# Patient Record
Sex: MALE | Race: WHITE | NOT HISPANIC OR LATINO | URBAN - METROPOLITAN AREA
[De-identification: names, ages, dates, MRNs, and addresses within clinical notes are randomized per-mention and may not be internally consistent; named-entity substitution may affect disease eponyms.]

---

## 2018-02-16 ENCOUNTER — NEW PATIENT COMPREHENSIVE (OUTPATIENT)
Dept: URBAN - METROPOLITAN AREA CLINIC 39 | Facility: CLINIC | Age: 78
End: 2018-02-16

## 2018-02-16 DIAGNOSIS — H25.813: ICD-10-CM

## 2018-02-16 DIAGNOSIS — H43.813: ICD-10-CM

## 2018-02-16 DIAGNOSIS — H40.013: ICD-10-CM

## 2018-02-16 PROCEDURE — 92004 COMPRE OPH EXAM NEW PT 1/>: CPT

## 2018-02-16 PROCEDURE — 92015 DETERMINE REFRACTIVE STATE: CPT

## 2018-02-16 ASSESSMENT — VISUAL ACUITY
OD_SC: J3
OS_CC: J10
OS_CC: 20/40-1
OS_SC: J3
OD_CC: J10
OD_CC: 20/40-1

## 2018-02-16 ASSESSMENT — TONOMETRY
OS_IOP_MMHG: 14
OD_IOP_MMHG: 12

## 2018-10-02 NOTE — PROCEDURE NOTE: CLINICAL
PROCEDURE NOTE: SLT OS. Diagnosis: POAG, Mild. Anesthesia: Topical. Prep: Alphagan 0.15%. Prior to treatment, risks/benefits/alternatives discussed including infection, loss of vision, hemorrhage, cataract, glaucoma, retinal tears or detachment. Lens:  SLT laser lens with goniosol. Power: 1.2mJ. Total applications: 924. Application 137 degrees. Patient tolerated procedure well. There were no complications. Post-op instructions given. Post-op IOP = 18 mmHg. Lorena King

## 2018-10-25 NOTE — PROCEDURE NOTE: CLINICAL
PROCEDURE NOTE: SLT OD. Diagnosis: POAG, Mild. Anesthesia: Topical. Prep: Alphagan 0.15%. Prior to treatment, risks/benefits/alternatives discussed including infection, loss of vision, hemorrhage, cataract, glaucoma, retinal tears or detachment. Lens:  SLT laser lens with goniosol. Power: 1.2mJ. Total applications: 865 Application 693 degrees. Patient tolerated procedure well. There were no complications. Post-op instructions given. Post-op IOP = 18 mmHg. Hodan Guillaume